# Patient Record
Sex: MALE | Race: WHITE
[De-identification: names, ages, dates, MRNs, and addresses within clinical notes are randomized per-mention and may not be internally consistent; named-entity substitution may affect disease eponyms.]

---

## 2021-08-08 ENCOUNTER — HOSPITAL ENCOUNTER (EMERGENCY)
Dept: HOSPITAL 47 - EC | Age: 26
Discharge: HOME | End: 2021-08-08
Payer: COMMERCIAL

## 2021-08-08 VITALS
TEMPERATURE: 98 F | RESPIRATION RATE: 16 BRPM | DIASTOLIC BLOOD PRESSURE: 80 MMHG | HEART RATE: 75 BPM | SYSTOLIC BLOOD PRESSURE: 126 MMHG

## 2021-08-08 DIAGNOSIS — M54.6: Primary | ICD-10-CM

## 2021-08-08 PROCEDURE — 72070 X-RAY EXAM THORAC SPINE 2VWS: CPT

## 2021-08-08 PROCEDURE — 72100 X-RAY EXAM L-S SPINE 2/3 VWS: CPT

## 2021-08-08 PROCEDURE — 99283 EMERGENCY DEPT VISIT LOW MDM: CPT

## 2021-08-08 NOTE — ED
General Adult HPI





- General


Chief complaint: Back Pain/Injury


Stated complaint: nausea, back pain


Time Seen by Provider: 08/08/21 16:20


Source: patient


Mode of arrival: ambulatory


Limitations: no limitations





- History of Present Illness


Initial comments: 


Dictation was produced using dragon dictation software. please excuse any 

grammatical, word or spelling errors. 











Chief Complaint: 18-year-old male presents with 3 months of back pain





History of Present Illness: To 18-year-old male is states that he is here in 

emergency department for evaluation of his back pain.  Patient states he has 

midthoracic back pain.  He's been having ongoing pain for the last 3 months.  

States that his pain really flared up the last couple days.  Patient does appear

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  Denies 

any numbness and paresthesias to the lower extremities.  Pain is nonradiating.  

States sometimes when it's acts up it makes him feel little nauseated.  His 

worst position is lying flat and lying right lateral decubitus position.  States

that he feels better when he stretches.  No difficulties urinating or stooling. 

He believes that all of this pain started when he fixed his girlfriends breaks 

on her car because he was hunched over for several hours.








The ROS documented in this emergency department record has been reviewed and 

confirmed by me.  Those systems with pertinent positive or negative responses 

have been documented in the HPI.  All other systems are other negative and/or 

noncontributory.








PHYSICAL EXAM:


General Impression: Alert and oriented x3, not in acute distress


HEENT: Normocephalic atraumatic, extra-ocular movements intact, pupils equal and

reactive to light bilaterally, mucous membranes moist.


Cardiovascular: Heart regular rate and rhythm


Chest: Able to complete full sentences, no retractions, no tachypnea


Abdomen: abdomen soft, non-tender, non-distended, no organomegaly


Musculoskeletal: Pulses present and equal in all extremities, no peripheral 

edema, tenderness to palpation over the mid and lower thoracic spinous 

processes, no lateral soft tissue palpatory tenderness


Motor:  no focal deficits noted


Neurological: CN II-XII grossly intact, no focal motor or sensory deficits noted


Skin: Intact with no visualized rashes


Psych: Normal affect and mood





ED course: 26-year-old male presents with acute on chronic thoracic back pain.  

Vital Signs upon arrival are within acceptable limits.


Thoracic and lumbar spine x-ray shows no acute processes.  Patient reevaluated a

t bedside front of his stable medical condition.  Patient given prescription for

analgesics.  He is also given referral to spine surgery.














- Related Data


                                Home Medications











 Medication  Instructions  Recorded  Confirmed


 


Buprenorphine HCl/Naloxone HCl 1 film SL DAILY 08/08/21 08/08/21





[Buprenorphine-Nalox 8-2Mg Film]   


 


Ibuprofen [Motrin] 800 mg PO Q8H PRN 08/08/21 08/08/21


 


Lisdexamfetamine Dimesylate 40 mg PO DAILY 08/08/21 08/08/21





[Vyvanse]   








                                  Previous Rx's











 Medication  Instructions  Recorded


 


Cyclobenzaprine [Flexeril] 10 mg PO TID PRN #20 tab 08/08/21


 


HYDROcodone/APAP 5-325MG [Norco 1 tab PO Q6HR PRN 3 Days #12 tab 08/08/21





5-325]  











                                    Allergies











Allergy/AdvReac Type Severity Reaction Status Date / Time


 


No Known Allergies Allergy   Verified 08/08/21 17:32














Review of Systems


ROS Statement: 


Those systems with pertinent positive or pertinent negative responses have been 

documented in the HPI.





ROS Other: All systems not noted in ROS Statement are negative.





Past Medical History


Past Medical History: No Reported History


History of Any Multi-Drug Resistant Organisms: None Reported


Past Surgical History: No Surgical Hx Reported


Past Psychological History: No Psychological Hx Reported


Smoking Status: Never smoker


Past Alcohol Use History: None Reported


Past Drug Use History: None Reported





General Exam


Limitations: no limitations





Course


                                   Vital Signs











  08/08/21





  16:17


 


Temperature 98.0 F


 


Pulse Rate 75


 


Respiratory 16





Rate 


 


Blood Pressure 126/80


 


O2 Sat by Pulse 100





Oximetry 














Disposition


Clinical Impression: 


 Thoracic back pain





Disposition: HOME SELF-CARE


Condition: Fair


Instructions (If sedation given, give patient instructions):  Back Pain (ED)


Prescriptions: 


Cyclobenzaprine [Flexeril] 10 mg PO TID PRN #20 tab


 PRN Reason: Muscle Spasm


HYDROcodone/APAP 5-325MG [Norco 5-325] 1 tab PO Q6HR PRN 3 Days #12 tab


 PRN Reason: Severe Pain


Is patient prescribed a controlled substance at d/c from ED?: Yes


If prescribed controlled substance>3 days was MAPS reviewed?: Prescribed <3 Days


Referrals: 


ZOE Ramirez DO [Doctor of Osteopathic Medicine] - 1-2 days

## 2021-08-08 NOTE — XR
EXAMINATION TYPE: XR thoracic spine 2V

 

DATE OF EXAM: 8/8/2021

 

COMPARISON: NONE

 

HISTORY: Back pain

 

TECHNIQUE: 3 views

 

FINDINGS: Thoracic vertebra have normal spacing and alignment. Posterior elements are intact. There i
s no paraspinal mass. There is no compression fracture.

 

IMPRESSION: Negative thoracic spine exam. No fracture.

## 2021-08-08 NOTE — XR
EXAMINATION TYPE: XR lumbar spine 2 or 3V

 

DATE OF EXAM: 8/8/2021

 

COMPARISON: 2/18/2012

 

HISTORY: Back pain

 

TECHNIQUE: 3 views

 

FINDINGS: Vertebra have normal alignment. Posterior elements are intact. Sacroiliac joints are intact
. There is no compression fracture. Disc spaces are normal.

 

IMPRESSION: Normal lumbar spine. No change.